# Patient Record
Sex: FEMALE | Race: WHITE | NOT HISPANIC OR LATINO | Employment: OTHER | URBAN - METROPOLITAN AREA
[De-identification: names, ages, dates, MRNs, and addresses within clinical notes are randomized per-mention and may not be internally consistent; named-entity substitution may affect disease eponyms.]

---

## 2023-10-18 ENCOUNTER — TELEPHONE (OUTPATIENT)
Dept: HEMATOLOGY/ONCOLOGY | Facility: CLINIC | Age: 23
End: 2023-10-18
Payer: COMMERCIAL

## 2023-10-18 ENCOUNTER — PATIENT MESSAGE (OUTPATIENT)
Dept: HEMATOLOGY/ONCOLOGY | Facility: CLINIC | Age: 23
End: 2023-10-18
Payer: COMMERCIAL

## 2023-10-18 NOTE — NURSING
Oncology nurse navigator contacted patient's father, Mr. Wilson Murrieta, to discuss medical advice needed. Mr. Murrieta daughter is currently treated at [facility]. She will be traveling to [place] for her brother's wedding and will be in town from October 19-23. They have been provided with Dr. Anthony's name should anything occur while in [place]. Nurse navigator provided contact information if assistance is necessary.

## 2023-10-18 NOTE — TELEPHONE ENCOUNTER
----- Message from Princess Brian RN sent at 10/18/2023  1:57 PM CDT -----  Contact: Pt's FatherWilson @ [phone]    ----- Message -----  From: Hannah Badillo  Sent: 10/18/2023  12:38 PM CDT  To: [facility] Cancer Navigation    Type:  Needs Medical Advice    Who Called: Pt's FatherWilson  Symptoms (please be specific): Sarcoma   How long has patient had these symptoms:  08/18/2023      Would the patient rather a call back or a response via [facility]? Call   Best Call Back Number: [phone]  Additional Information: Pt is currently a pt at [facility]. Pt will be traveling to [place] Oct 19-22, 2023, and her Provider Lizzy Carlson advised her to contact the [facility] just in case she may need our services. Dr. Anthony was recommended.

## 2023-12-14 ENCOUNTER — TELEPHONE (OUTPATIENT)
Dept: HEMATOLOGY/ONCOLOGY | Facility: CLINIC | Age: 23
End: 2023-12-14
Payer: COMMERCIAL

## 2023-12-14 NOTE — TELEPHONE ENCOUNTER
----- Message from Corinne E Coniglio, RN sent at 12/13/2023  3:49 PM CST -----  Regarding: FW: Orders  Contact: Pt  [phone]    ----- Message -----  From: Ebony Fry  Sent: 12/13/2023   3:44 PM CST  To: [facility] Cancer Navigation  Subject: Orders                                                       Appt Type:  Lab     Date/Time Preference:  12/18, 12/21 and 12/28     Treating Provider: States she was referred to Dr. Uribe from provider at [facility]     Caller Name: Ana     Contact Prefer:  [phone]    Comments/Notes:  Requesting orders placed for lab at the [facility] for 12/18, 12/21 and 12/28

## 2023-12-14 NOTE — NURSING
Oncology nurse navigator returned call to patient. She is requesting labs locally drawn in [place] over the holidays. Advised that nurse will discuss with [facility] staff and ask where orders can be sent.

## 2023-12-18 ENCOUNTER — TELEPHONE (OUTPATIENT)
Dept: HEMATOLOGY/ONCOLOGY | Facility: CLINIC | Age: 23
End: 2023-12-18
Payer: COMMERCIAL

## 2023-12-18 NOTE — NURSING
Oncology nurse navigator contacted patient, states she would like to establish care locally for needs when she is in town. Treated by Dr. Carlson at [facility] for sarcoma. Scheduled and confirmed appointment this Wednesday 12/20/23 at 11am. Patient provided with nurse navigator's direct number for further assistance.

## 2023-12-18 NOTE — TELEPHONE ENCOUNTER
----- Message from Lorena Lopez sent at 12/18/2023 11:16 AM CST -----  Type: General Call Back         Name of Caller:pt  Would the patient rather a call back or a response via [facility]? call  Best Call Back Number:[phone]   Additional Information: Pt states she is waiting for labs for blood work to be sent in. States she was referred to Dr. Uribe from provider at [facility]. Please call pt with further information and assistance. Thank You.

## 2023-12-20 ENCOUNTER — LAB VISIT (OUTPATIENT)
Dept: LAB | Facility: HOSPITAL | Age: 23
End: 2023-12-20
Attending: INTERNAL MEDICINE
Payer: COMMERCIAL

## 2023-12-20 ENCOUNTER — OFFICE VISIT (OUTPATIENT)
Dept: HEMATOLOGY/ONCOLOGY | Facility: CLINIC | Age: 23
End: 2023-12-20
Payer: COMMERCIAL

## 2023-12-20 VITALS
OXYGEN SATURATION: 99 % | WEIGHT: 141.13 LBS | RESPIRATION RATE: 18 BRPM | HEART RATE: 78 BPM | TEMPERATURE: 98 F | SYSTOLIC BLOOD PRESSURE: 109 MMHG | HEIGHT: 66 IN | DIASTOLIC BLOOD PRESSURE: 55 MMHG | BODY MASS INDEX: 22.68 KG/M2

## 2023-12-20 DIAGNOSIS — D70.1 CHEMOTHERAPY-INDUCED NEUTROPENIA: ICD-10-CM

## 2023-12-20 DIAGNOSIS — C49.9 SYNOVIAL SARCOMA: ICD-10-CM

## 2023-12-20 DIAGNOSIS — D69.6 THROMBOPENIA: ICD-10-CM

## 2023-12-20 DIAGNOSIS — D63.0 ANEMIA IN NEOPLASTIC DISEASE: ICD-10-CM

## 2023-12-20 DIAGNOSIS — C49.9 SYNOVIAL SARCOMA: Primary | ICD-10-CM

## 2023-12-20 DIAGNOSIS — T45.1X5A CHEMOTHERAPY-INDUCED NEUTROPENIA: ICD-10-CM

## 2023-12-20 LAB
ALBUMIN SERPL BCP-MCNC: 4.3 G/DL (ref 3.5–5.2)
ALP SERPL-CCNC: 49 U/L (ref 55–135)
ALT SERPL W/O P-5'-P-CCNC: 13 U/L (ref 10–44)
ANION GAP SERPL CALC-SCNC: 8 MMOL/L (ref 8–16)
ANISOCYTOSIS BLD QL SMEAR: SLIGHT
AST SERPL-CCNC: 11 U/L (ref 10–40)
BASOPHILS NFR BLD: 0 % (ref 0–1.9)
BILIRUB SERPL-MCNC: 0.3 MG/DL (ref 0.1–1)
BUN SERPL-MCNC: 9 MG/DL (ref 6–20)
CALCIUM SERPL-MCNC: 9.9 MG/DL (ref 8.7–10.5)
CHLORIDE SERPL-SCNC: 106 MMOL/L (ref 95–110)
CO2 SERPL-SCNC: 28 MMOL/L (ref 23–29)
CREAT SERPL-MCNC: 0.7 MG/DL (ref 0.5–1.4)
DACRYOCYTES BLD QL SMEAR: ABNORMAL
DIFFERENTIAL METHOD: ABNORMAL
EOSINOPHIL NFR BLD: 0 % (ref 0–8)
ERYTHROCYTE [DISTWIDTH] IN BLOOD BY AUTOMATED COUNT: 13.8 % (ref 11.5–14.5)
EST. GFR  (NO RACE VARIABLE): >60 ML/MIN/1.73 M^2
GLUCOSE SERPL-MCNC: 89 MG/DL (ref 70–110)
HCT VFR BLD AUTO: 32.1 % (ref 37–48.5)
HGB BLD-MCNC: 11.2 G/DL (ref 12–16)
IMM GRANULOCYTES # BLD AUTO: ABNORMAL K/UL (ref 0–0.04)
IMM GRANULOCYTES NFR BLD AUTO: ABNORMAL % (ref 0–0.5)
LYMPHOCYTES NFR BLD: 9 % (ref 18–48)
MCH RBC QN AUTO: 29.9 PG (ref 27–31)
MCHC RBC AUTO-ENTMCNC: 34.9 G/DL (ref 32–36)
MCV RBC AUTO: 86 FL (ref 82–98)
METAMYELOCYTES NFR BLD MANUAL: 2 %
MONOCYTES NFR BLD: 14 % (ref 4–15)
NEUTROPHILS NFR BLD: 74 % (ref 38–73)
NEUTS BAND NFR BLD MANUAL: 1 %
NRBC BLD-RTO: 0 /100 WBC
OVALOCYTES BLD QL SMEAR: ABNORMAL
PLATELET # BLD AUTO: 128 K/UL (ref 150–450)
PLATELET BLD QL SMEAR: ABNORMAL
PMV BLD AUTO: 10.8 FL (ref 9.2–12.9)
POIKILOCYTOSIS BLD QL SMEAR: SLIGHT
POLYCHROMASIA BLD QL SMEAR: ABNORMAL
POTASSIUM SERPL-SCNC: 4.3 MMOL/L (ref 3.5–5.1)
PROT SERPL-MCNC: 7.4 G/DL (ref 6–8.4)
RBC # BLD AUTO: 3.74 M/UL (ref 4–5.4)
SODIUM SERPL-SCNC: 142 MMOL/L (ref 136–145)
WBC # BLD AUTO: 5.94 K/UL (ref 3.9–12.7)

## 2023-12-20 PROCEDURE — 99999 PR PBB SHADOW E&M-EST. PATIENT-LVL III: CPT | Mod: PBBFAC,,, | Performed by: INTERNAL MEDICINE

## 2023-12-20 PROCEDURE — 3008F PR BODY MASS INDEX (BMI) DOCUMENTED: ICD-10-PCS | Mod: CPTII,S$GLB,, | Performed by: INTERNAL MEDICINE

## 2023-12-20 PROCEDURE — 99204 OFFICE O/P NEW MOD 45 MIN: CPT | Mod: S$GLB,,, | Performed by: INTERNAL MEDICINE

## 2023-12-20 PROCEDURE — 3074F PR MOST RECENT SYSTOLIC BLOOD PRESSURE < 130 MM HG: ICD-10-PCS | Mod: CPTII,S$GLB,, | Performed by: INTERNAL MEDICINE

## 2023-12-20 PROCEDURE — 99999 PR PBB SHADOW E&M-EST. PATIENT-LVL III: ICD-10-PCS | Mod: PBBFAC,,, | Performed by: INTERNAL MEDICINE

## 2023-12-20 PROCEDURE — 3078F PR MOST RECENT DIASTOLIC BLOOD PRESSURE < 80 MM HG: ICD-10-PCS | Mod: CPTII,S$GLB,, | Performed by: INTERNAL MEDICINE

## 2023-12-20 PROCEDURE — 3008F BODY MASS INDEX DOCD: CPT | Mod: CPTII,S$GLB,, | Performed by: INTERNAL MEDICINE

## 2023-12-20 PROCEDURE — 36415 COLL VENOUS BLD VENIPUNCTURE: CPT | Performed by: INTERNAL MEDICINE

## 2023-12-20 PROCEDURE — 99204 PR OFFICE/OUTPT VISIT, NEW, LEVL IV, 45-59 MIN: ICD-10-PCS | Mod: S$GLB,,, | Performed by: INTERNAL MEDICINE

## 2023-12-20 PROCEDURE — 85027 COMPLETE CBC AUTOMATED: CPT | Performed by: INTERNAL MEDICINE

## 2023-12-20 PROCEDURE — 3044F HG A1C LEVEL LT 7.0%: CPT | Mod: CPTII,S$GLB,, | Performed by: INTERNAL MEDICINE

## 2023-12-20 PROCEDURE — 85007 BL SMEAR W/DIFF WBC COUNT: CPT | Performed by: INTERNAL MEDICINE

## 2023-12-20 PROCEDURE — 80053 COMPREHEN METABOLIC PANEL: CPT | Performed by: INTERNAL MEDICINE

## 2023-12-20 PROCEDURE — 3078F DIAST BP <80 MM HG: CPT | Mod: CPTII,S$GLB,, | Performed by: INTERNAL MEDICINE

## 2023-12-20 PROCEDURE — 3074F SYST BP LT 130 MM HG: CPT | Mod: CPTII,S$GLB,, | Performed by: INTERNAL MEDICINE

## 2023-12-20 PROCEDURE — 3044F PR MOST RECENT HEMOGLOBIN A1C LEVEL <7.0%: ICD-10-PCS | Mod: CPTII,S$GLB,, | Performed by: INTERNAL MEDICINE

## 2023-12-20 RX ORDER — AMOXICILLIN AND CLAVULANATE POTASSIUM 875; 125 MG/1; MG/1
875 TABLET, FILM COATED ORAL
COMMUNITY
Start: 2023-12-15 | End: 2023-12-22

## 2023-12-20 RX ORDER — CIPROFLOXACIN 750 MG/1
750 TABLET, FILM COATED ORAL
COMMUNITY
Start: 2023-12-15 | End: 2023-12-22

## 2023-12-20 RX ORDER — SUCRALFATE 1 G/1
TABLET ORAL
COMMUNITY
Start: 2023-09-20

## 2023-12-20 RX ORDER — PANTOPRAZOLE SODIUM 20 MG/1
20 TABLET, DELAYED RELEASE ORAL
COMMUNITY

## 2023-12-20 RX ORDER — SODIUM CHLORIDE 0.9 % (FLUSH) 0.9 %
SYRINGE (ML) INJECTION
COMMUNITY
Start: 2023-12-12

## 2023-12-20 NOTE — PROGRESS NOTES
CONSULT NOTE    Subjective:       Patient ID: Ana Murrieta is a 23 y.o. female.  MRN: [id number]  : 2000    Chief Complaint: Synovial sarcoma     History of Present Illness:   Ana Murrieta is a 23 y.o. female who is referred for synovial sarcoma, being treated at [facility], here to establish care while she is visiting family in the [facility].     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 She is currently on therapy with Doxorubicin 75mg/m2 bolus plus Zinecard on Day 1 and Ifosfamide 10g/m2 in divided dose over 4 days.    Patient was in her [place] until 3/2023 when she developed sore throat, cough and right rib pain during a ski trip. No fever. Seen at an urgent care-was treated with antibiotics and steroids, with resolution of symptoms. Her symptoms recurred in 2023 and she underwent a CXR in early 2023 that revealed a right lung mass. CT chest 6/15/23 : heterogeneous solid mass in right lower lobe measuring 7.1 x 5.0 x 6.8 cm. An additional well-circumscribed 7 mm lung nodule is noted in the dorsal portion of the right costophrenic angle. Patient was seen by a local pulmonologist 23, and then referred to [facility] to expedite work up. Patient was seen in the [facility] for initial consultation on : Referred to IR for biopsy, with biopsy on 23 demonstrating a monophasic synovial sarcoma. [place] [facility] [place] She preceded with staging PET/CT imaging on . Around the time of the scan, she developed severe headache, nausea, vomiting, and loss of  peripheral vision / blurred vision. She presented to the [facility]. CT Demonstrated hemorrhagic brain metastases. She was admitted to the ICU and Neurosurgery was consulted. On 9/1/23 she underwent right parietal craniotomy with MRI on 9/2 demonstrating gross total resection of dural based metastasis along the posterior falx and evacuation of adjacent hemorrhage, thin subacute subdural hemorrhage along the right flax to be followed, and decrease in size of enhancing partially cystic left superior frontal gyrus metastasis. She was discharged on 9/4/23. She completed RT to the surgical cavity and the intact lesion. In the interim she was initiated on chemotherapy with HD ifosfamide x 2 cycles. After completion of radiation she was initiated on therapy with doxorubicin/ifosfamide.    She reports tolerating therapy well without significant complaints. She does report experiencing some mild nasal congestion, runny nose. She denies any fevers. She denies any sick exposures.     Interim history:  Visiting from [place], staying in , staying till 1/2.   Was admitted for Obs at [facility] on 12/17 when she was found to be febrile. Cultures were negative, she was observed and dc's on amoxicillin and ciprofloxacin, has one day left. She also needed PRBC and platelet transfusion.     Has completed 4 cycles, half way through chemo.   Has completed brain RT for one week, got reduced chemo dose at the time and with plans to receive full dose chemo for 6 cycles.     Had  more nausea with the most recent chemo. More fatigue and some mucositis, both improving.       Oncology History:  Per [facility] notes and updated    CT chest with contrast on 6/15/2023: Heterogeneous solid mass in right lower lobe measuring 7.1 x 5.0 x 6.8 cm. An additional well-circumscribed 7 mm lung nodule is noted in the dorsal portion of the right costophrenic angle.     Synovial sarcoma   3/2023 Initial Diagnosis   Patient developed sore throat, cough, and right rib pain  during a ski trip in 3/2023. Symptoms reoccurred and in 5/2023.    6/2023 CXR demonstrated a large right lung mass.     6/15/2023 DX-Radiographic Evaluation   CT chest with contrast : Heterogeneous solid mass in right lower lobe measuring 7.1 x 5.0 x 6.8 cm. An additional well-circumscribed 7 mm lung nodule is noted in the dorsal portion of the right costophrenic angle.     8/18/2023 DX-Biopsy   A: Lung, right lower lobe mass, biopsy:  SPINDLE CELL NEOPLASM COMPATIBLE WITH MONOPHASIC SYNOVIAL SARCOMA.  See comment    8/24/2023 DX-Radiographic Evaluation   8/24, following PET/CT imaging, patient developed sever headache, loss of peripheral vision, n/v. Presented to [facility].    8/25/23: CT Head IMPRESSION: 1. 5 cm right posterior parafalcine dural based hemorrhagic metastasis with mild surrounding vasogenic edema and mass effect. Associated subfalcine herniation of the tumor. 2. 6 mm thick subdural hemorrhage along the right falx and medial tentorial leaflets. 3. Another 1.2 cm peripherally hemorrhagic lesion within left corona radiata.    8/25/23 MRI Brain: Enhancing dural based 3 x 2.4 x 2.7 metastasis originating from the posterior aspect of the falx and extending to the right and left. Associated 4.4 x 3.6 x 4.1 cm extra-axial hematoma along the right side of the falx with mass effect on the adjacent parietal lobe. Again seen, 5.3 cm predominantly hemorrhagic mass in the right parietal occipital region with broad-based dural attachment and enhancing nodular component involving the posterior falx. Associated stable 0.6 cm subdural hematoma along the anterior falx and medial tentorial leaflet. A 1.7 cm predominantly cystic necrotic, rim-enhancing parenchymal metastasis in the left centrum semiovale.    9/1/2023 TX-Surgery   A: Right side brain, hematoma:  METASTATIC SPINDLE CELL NEOPLASM COMPATIBLE WITH PATIENT'S KNOWN MONOPHASIC SYNOVIAL SARCOMA.  (SEE COMMENT)    B: Parietal lobe, right, right parietal mass for  frozen:  METASTATIC SPINDLE CELL NEOPLASM COMPATIBLE WITH PATIENT'S KNOWN MONOPHASIC SYNOVIAL SARCOMA.   (SEE COMMENT)    C: Parietal lobe, right, right parietal mass:  METASTATIC SPINDLE CELL NEOPLASM COMPATIBLE WITH PATIENT'S KNOWN MONOPHASIC SYNOVIAL SARCOMA.   (SEE COMMENT)    9/2/2023 DX-Radiographic Evaluation   MRI Brain: Gross total resection of the dural based enhancing metastasis along the posterior falx and evacuation of adjacent hemorrhage. Restricted diffusion along the resection cavity margin can be followed on subsequent imaging. Thin subacute subdural hemorrhage along the right falx may represent redistribution of hemorrhage, as detailed above, and can be followed on subsequent imaging. No unexpected large volume acute intracranial hemorrhage. Interval decrease in size of the peripherally enhancing partially cystic left superior frontal gyrus metastasis. No new intracranial metastasis.    9/19/2023 - 11/6/2023 TX-Chemotherapy/Biotherapy/Investigational/SMI   Therapy: HD Ifosfamide 14g/m2 over 5 days  > Planned for GK to brain. Plan for future cycles to be standard therapy AI following RT>   ( C1 14g/m2, C2 12g/m2)  Indication/Goal: First-line   Cycles: 2  Course of treatment:     10/2/2023 - 10/4/2023 TX-Radiation Therapy   GK     Site Start Last D/Fx Fx Dose Modality Technique   1RP.Cavity 10/2/2023 10/4/2023 900 cGy 3/3 2700/2700 cGy x06 STEREOTACTIC       TX-Chemotherapy/Biotherapy/Investigational/SMI   Therapy: Doxorubicin 75mg/m2 bolus plus Zinecard on Day 1 and Ifosfamide 10g/m2 in divided dose over 4 days.  (2 previous cycles of ifosfamide with RT)  Indication/Goal:   Cycles: 1  Course of treatment:   MUGA 8/24/23 - LVEF 72%      Secondary malignant neoplasm of brain     History:  No past medical history on file.   No past surgical history on file.  No family history on file.   Social History     Tobacco Use    Smoking status: Not on file    Smokeless tobacco: Not on file   Substance and  "Sexual Activity    Alcohol use: Not on file    Drug use: Not on file    Sexual activity: Not on file        ROS:   Review of Systems   Constitutional:  Positive for malaise/fatigue. Negative for fever and weight loss.   HENT:  Negative for congestion, hearing loss, nosebleeds and sore throat.    Eyes:  Negative for double vision and photophobia.   Respiratory:  Negative for cough, hemoptysis, sputum production, shortness of breath and wheezing.    Cardiovascular:  Negative for chest pain, palpitations, orthopnea and leg swelling.   Gastrointestinal:  Positive for nausea. Negative for abdominal pain, blood in stool, constipation, diarrhea, heartburn and vomiting.   Genitourinary:  Negative for dysuria, hematuria and urgency.   Musculoskeletal:  Negative for back pain, joint pain and myalgias.   Skin:  Negative for itching and rash.   Neurological:  Negative for dizziness, tingling, seizures, weakness and headaches.   Endo/Heme/Allergies:  Negative for polydipsia. Does not bruise/bleed easily.   Psychiatric/Behavioral:  Negative for depression and memory loss. The patient is not nervous/anxious and does not have insomnia.         Objective:     Vitals:    12/20/23 1101   BP: (!) 109/55   Pulse: 78   Resp: 18   Temp: 98.1 °F (36.7 °C)   TempSrc: Oral   SpO2: 99%   Weight: 64 kg (141 lb 1.5 oz)   Height: 5' 6" (1.676 m)   PainSc: 0-No pain       Physical Examination:   Physical Exam  Constitutional:       Appearance: Normal appearance.   HENT:      Head: Normocephalic and atraumatic.      Nose: Nose normal.   Eyes:      Conjunctiva/sclera: Conjunctivae normal.   Cardiovascular:      Rate and Rhythm: Normal rate.   Pulmonary:      Effort: Pulmonary effort is normal.   Musculoskeletal:         General: Normal range of motion.      Cervical back: Normal range of motion.   Skin:     General: Skin is warm and dry.   Neurological:      General: No focal deficit present.      Mental Status: She is alert and oriented to person, " place, and time.   Psychiatric:         Mood and Affect: Mood normal.          Diagnostic Tests:  Significant Imaging: I have reviewed and interpreted all pertinent imaging results/findings.      Laboratory Data:  All pertinent labs have been reviewed.    Labs:   Lab Results   Component Value Date    WBC 5.94 12/20/2023    HGB 11.2 (L) 12/20/2023    HCT 32.1 (L) 12/20/2023    MCV 86 12/20/2023     (L) 12/20/2023       Assessment/Plan:   Synovial sarcoma    Treatment to be continued at [facility]. Continue supportive care.   Weekly PICC line dressing change and labs to be done at [facility]. Discussed with nurse navigator and infusion team.     -     CBC Auto Differential; Standing  -     CMP; Future; Expected date: 12/20/2023  -     CMP; Future; Expected date: 12/20/2023    Anemia in neoplastic disease  Thrombopenia  Chemotherapy-induced neutropenia  Monitor weekly labs.     Other orders  -     Cancel: heparin, porcine (PF) 100 unit/mL injection flush 500 Units  -     Cancel: sodium chloride 0.9% flush 10 mL  -     Cancel: heparin, porcine (PF) 100 unit/mL injection flush 500 Units  -     Cancel: sodium chloride 0.9% flush 10 mL       ECOG SCORE    1 - Restricted in strenuous activity-ambulatory and able to carry out work of a light nature           Discussion:   No follow-ups on file.    Plan was discussed with the patient at length, and she verbalized understanding. Ana was given an opportunity to ask questions that were answered to her satisfaction, and she was advised to call in the interval if any problems or questions arise.    Electronically signed by Makenzie Anthony MD      Route Chart for Scheduling    Med Onc Chart Routing  Urgent    Follow up with physician . Prn   Follow up with [facility]    Infusion scheduling note   needs PICC line dressing change this week in    Injection scheduling note    Labs CBC and CMP   Scheduling:  Preferred lab:  Lab interval:  today and in one week in    Imaging    Pharmacy  appointment    Other referrals                    Therapy Plan Information  Flushes  heparin, porcine (PF) 100 unit/mL injection flush 500 Units  500 Units, Intravenous, Every visit  sodium chloride 0.9% flush 10 mL  10 mL, Intravenous, Every visit  heparin, porcine (PF) 100 unit/mL injection flush 500 Units  500 Units, Intravenous, Every visit  sodium chloride 0.9% flush 10 mL  10 mL, Intravenous, Every visit

## 2023-12-21 ENCOUNTER — INFUSION (OUTPATIENT)
Dept: INFUSION THERAPY | Facility: HOSPITAL | Age: 23
End: 2023-12-21
Attending: INTERNAL MEDICINE
Payer: COMMERCIAL

## 2023-12-21 DIAGNOSIS — C49.9 SYNOVIAL SARCOMA: Primary | ICD-10-CM

## 2023-12-21 PROCEDURE — A4216 STERILE WATER/SALINE, 10 ML: HCPCS | Performed by: INTERNAL MEDICINE

## 2023-12-21 PROCEDURE — 25000003 PHARM REV CODE 250: Performed by: INTERNAL MEDICINE

## 2023-12-21 PROCEDURE — 96523 IRRIG DRUG DELIVERY DEVICE: CPT

## 2023-12-21 RX ORDER — SODIUM CHLORIDE 0.9 % (FLUSH) 0.9 %
10 SYRINGE (ML) INJECTION
Status: CANCELLED | OUTPATIENT
Start: 2023-12-21

## 2023-12-21 RX ORDER — HEPARIN 100 UNIT/ML
500 SYRINGE INTRAVENOUS
Status: DISCONTINUED | OUTPATIENT
Start: 2023-12-21 | End: 2023-12-21 | Stop reason: HOSPADM

## 2023-12-21 RX ORDER — HEPARIN 100 UNIT/ML
500 SYRINGE INTRAVENOUS
Status: CANCELLED | OUTPATIENT
Start: 2023-12-21

## 2023-12-21 RX ORDER — SODIUM CHLORIDE 0.9 % (FLUSH) 0.9 %
10 SYRINGE (ML) INJECTION
Status: COMPLETED | OUTPATIENT
Start: 2023-12-21 | End: 2023-12-21

## 2023-12-21 RX ADMIN — Medication 10 ML: at 09:12

## 2023-12-21 NOTE — NURSING
Right double lumen PICC good blood return, flushed, and dressing changed. RTC for next dressing change and labs on 12/28/2023.

## 2023-12-28 ENCOUNTER — INFUSION (OUTPATIENT)
Dept: INFUSION THERAPY | Facility: HOSPITAL | Age: 23
End: 2023-12-28
Attending: INTERNAL MEDICINE
Payer: COMMERCIAL

## 2023-12-28 ENCOUNTER — LAB VISIT (OUTPATIENT)
Dept: LAB | Facility: HOSPITAL | Age: 23
End: 2023-12-28
Attending: INTERNAL MEDICINE
Payer: COMMERCIAL

## 2023-12-28 DIAGNOSIS — C49.9 SYNOVIAL SARCOMA: ICD-10-CM

## 2023-12-28 DIAGNOSIS — C49.9 SYNOVIAL SARCOMA: Primary | ICD-10-CM

## 2023-12-28 LAB
ALBUMIN SERPL BCP-MCNC: 4.2 G/DL (ref 3.5–5.2)
ALP SERPL-CCNC: 37 U/L (ref 55–135)
ALT SERPL W/O P-5'-P-CCNC: 10 U/L (ref 10–44)
ANION GAP SERPL CALC-SCNC: 9 MMOL/L (ref 8–16)
AST SERPL-CCNC: 10 U/L (ref 10–40)
BASOPHILS # BLD AUTO: 0.05 K/UL (ref 0–0.2)
BASOPHILS NFR BLD: 1.9 % (ref 0–1.9)
BILIRUB SERPL-MCNC: 0.1 MG/DL (ref 0.1–1)
BUN SERPL-MCNC: 8 MG/DL (ref 6–20)
CALCIUM SERPL-MCNC: 9.7 MG/DL (ref 8.7–10.5)
CHLORIDE SERPL-SCNC: 106 MMOL/L (ref 95–110)
CO2 SERPL-SCNC: 27 MMOL/L (ref 23–29)
CREAT SERPL-MCNC: 0.7 MG/DL (ref 0.5–1.4)
DIFFERENTIAL METHOD BLD: ABNORMAL
EOSINOPHIL # BLD AUTO: 0 K/UL (ref 0–0.5)
EOSINOPHIL NFR BLD: 1.6 % (ref 0–8)
ERYTHROCYTE [DISTWIDTH] IN BLOOD BY AUTOMATED COUNT: 14.3 % (ref 11.5–14.5)
EST. GFR  (NO RACE VARIABLE): >60 ML/MIN/1.73 M^2
GLUCOSE SERPL-MCNC: 91 MG/DL (ref 70–110)
HCT VFR BLD AUTO: 32.5 % (ref 37–48.5)
HGB BLD-MCNC: 10.8 G/DL (ref 12–16)
IMM GRANULOCYTES # BLD AUTO: 0 K/UL (ref 0–0.04)
IMM GRANULOCYTES NFR BLD AUTO: 0 % (ref 0–0.5)
LYMPHOCYTES # BLD AUTO: 0.5 K/UL (ref 1–4.8)
LYMPHOCYTES NFR BLD: 20.5 % (ref 18–48)
MCH RBC QN AUTO: 30.3 PG (ref 27–31)
MCHC RBC AUTO-ENTMCNC: 33.2 G/DL (ref 32–36)
MCV RBC AUTO: 91 FL (ref 82–98)
MONOCYTES # BLD AUTO: 0.8 K/UL (ref 0.3–1)
MONOCYTES NFR BLD: 32.6 % (ref 4–15)
NEUTROPHILS # BLD AUTO: 1.1 K/UL (ref 1.8–7.7)
NEUTROPHILS NFR BLD: 43.4 % (ref 38–73)
NRBC BLD-RTO: 0 /100 WBC
PLATELET # BLD AUTO: 304 K/UL (ref 150–450)
PMV BLD AUTO: 8.9 FL (ref 9.2–12.9)
POTASSIUM SERPL-SCNC: 4.2 MMOL/L (ref 3.5–5.1)
PROT SERPL-MCNC: 6.9 G/DL (ref 6–8.4)
RBC # BLD AUTO: 3.57 M/UL (ref 4–5.4)
SODIUM SERPL-SCNC: 142 MMOL/L (ref 136–145)
WBC # BLD AUTO: 2.58 K/UL (ref 3.9–12.7)

## 2023-12-28 PROCEDURE — 25000003 PHARM REV CODE 250: Performed by: INTERNAL MEDICINE

## 2023-12-28 PROCEDURE — 36415 COLL VENOUS BLD VENIPUNCTURE: CPT | Performed by: INTERNAL MEDICINE

## 2023-12-28 PROCEDURE — A4216 STERILE WATER/SALINE, 10 ML: HCPCS | Performed by: INTERNAL MEDICINE

## 2023-12-28 PROCEDURE — 80053 COMPREHEN METABOLIC PANEL: CPT | Performed by: INTERNAL MEDICINE

## 2023-12-28 PROCEDURE — 96523 IRRIG DRUG DELIVERY DEVICE: CPT

## 2023-12-28 PROCEDURE — 85025 COMPLETE CBC W/AUTO DIFF WBC: CPT | Performed by: INTERNAL MEDICINE

## 2023-12-28 RX ORDER — HEPARIN 100 UNIT/ML
500 SYRINGE INTRAVENOUS
OUTPATIENT
Start: 2023-12-28

## 2023-12-28 RX ORDER — SODIUM CHLORIDE 0.9 % (FLUSH) 0.9 %
10 SYRINGE (ML) INJECTION
Status: COMPLETED | OUTPATIENT
Start: 2023-12-28 | End: 2023-12-28

## 2023-12-28 RX ORDER — SODIUM CHLORIDE 0.9 % (FLUSH) 0.9 %
10 SYRINGE (ML) INJECTION
OUTPATIENT
Start: 2023-12-28

## 2023-12-28 RX ADMIN — Medication 10 ML: at 09:12

## 2023-12-28 NOTE — NURSING
Ana is here for a PICC line dressing change. On arrival, old dressing noted to be clean, dry, and intact. Lumen 1 and lumen 2 noted to have a great blood return and both flushing easily. Upon dressing removal PICC line insertion site without any redness, swelling, and/or drainage. Sutures intact holding PICC line in place. Site cleaned via sterile technique. Sterile dressing placed. Dated, timed, and initialed new dressing.